# Patient Record
Sex: MALE | Race: WHITE | NOT HISPANIC OR LATINO | Employment: OTHER | URBAN - METROPOLITAN AREA
[De-identification: names, ages, dates, MRNs, and addresses within clinical notes are randomized per-mention and may not be internally consistent; named-entity substitution may affect disease eponyms.]

---

## 2017-02-17 ENCOUNTER — HISTORICAL (OUTPATIENT)
Dept: ADMINISTRATIVE | Facility: HOSPITAL | Age: 67
End: 2017-02-17

## 2017-02-19 LAB
APTT PPP: 41.9 SEC (ref 21.7–37.8)
INR PPP: 1.4
PROTHROMBIN TIME: 17.2 SEC (ref 11.3–15.2)

## 2017-03-03 ENCOUNTER — HISTORICAL (OUTPATIENT)
Dept: ADMINISTRATIVE | Facility: HOSPITAL | Age: 67
End: 2017-03-03

## 2017-03-03 LAB
APTT PPP: 45.1 SEC (ref 21.7–37.8)
BASOPHILS NFR BLD: 0 K/UL (ref 0–0.2)
BASOPHILS NFR BLD: 0.5 %
BUN SERPL-MCNC: 23 MG/DL (ref 8–20)
CALCIUM SERPL-MCNC: 8.3 MG/DL (ref 7.7–10.4)
CHLORIDE: 96 MMOL/L (ref 98–110)
CO2 SERPL-SCNC: 26.6 MMOL/L (ref 22.8–31.6)
CREATININE: 0.96 MG/DL (ref 0.6–1.4)
EOSINOPHIL NFR BLD: 0 K/UL (ref 0–0.7)
EOSINOPHIL NFR BLD: 0.2 %
ERYTHROCYTE [DISTWIDTH] IN BLOOD BY AUTOMATED COUNT: 17.2 % (ref 11.7–14.9)
GLUCOSE: 291 MG/DL (ref 70–99)
GRAN #: 3.8 K/UL (ref 1.4–6.5)
GRAN%: 62.7 %
HCT VFR BLD AUTO: 30.5 % (ref 39–55)
HGB BLD-MCNC: 9.9 G/DL (ref 14–16)
IMMATURE GRANS (ABS): 0.4 K/UL (ref 0–1)
IMMATURE GRANULOCYTES: 7.3 %
INR PPP: 1.3
LYMPH #: 0.4 K/UL (ref 1.2–3.4)
LYMPH%: 7.1 %
MCH RBC QN AUTO: 31.4 PG (ref 25–35)
MCHC RBC AUTO-ENTMCNC: 32.5 G/DL (ref 31–36)
MCV RBC AUTO: 96.8 FL (ref 80–100)
MONO #: 1.3 K/UL (ref 0.1–0.6)
MONO%: 22.2 %
NUCLEATED RBCS: 0 %
PLATELET # BLD AUTO: 105 K/UL (ref 140–440)
PMV BLD AUTO: 12.5 FL (ref 8.8–12.7)
POTASSIUM SERPL-SCNC: 4.4 MMOL/L (ref 3.5–5)
PROTHROMBIN TIME: 16 SEC (ref 11.3–15.2)
RBC # BLD AUTO: 3.15 M/UL (ref 4.3–5.9)
SODIUM: 130 MMOL/L (ref 134–144)
WBC # BLD AUTO: 6 K/UL (ref 5–10)

## 2017-05-05 VITALS — WEIGHT: 315 LBS | HEIGHT: 71 IN | BODY MASS INDEX: 44.1 KG/M2

## 2017-05-05 RX ORDER — HEPARIN 100 UNIT/ML
500 SYRINGE INTRAVENOUS
Status: CANCELLED | OUTPATIENT
Start: 2017-05-12

## 2017-05-05 RX ORDER — SODIUM CHLORIDE 0.9 % (FLUSH) 0.9 %
10 SYRINGE (ML) INJECTION
Status: CANCELLED | OUTPATIENT
Start: 2017-05-12

## 2017-05-18 ENCOUNTER — OFFICE VISIT (OUTPATIENT)
Dept: HEMATOLOGY/ONCOLOGY | Facility: CLINIC | Age: 67
End: 2017-05-18
Payer: MEDICARE

## 2017-05-18 VITALS
BODY MASS INDEX: 48.95 KG/M2 | RESPIRATION RATE: 18 BRPM | TEMPERATURE: 98 F | DIASTOLIC BLOOD PRESSURE: 72 MMHG | WEIGHT: 315 LBS | HEART RATE: 100 BPM | SYSTOLIC BLOOD PRESSURE: 110 MMHG

## 2017-05-18 DIAGNOSIS — D70.1 CHEMOTHERAPY-INDUCED NEUTROPENIA: Primary | ICD-10-CM

## 2017-05-18 DIAGNOSIS — T45.1X5A CHEMOTHERAPY-INDUCED NEUTROPENIA: Primary | ICD-10-CM

## 2017-05-18 DIAGNOSIS — C18.9 COLON CANCER METASTASIZED TO LIVER: ICD-10-CM

## 2017-05-18 DIAGNOSIS — C78.7 COLON CANCER METASTASIZED TO LIVER: ICD-10-CM

## 2017-05-18 PROCEDURE — 99214 OFFICE O/P EST MOD 30 MIN: CPT | Mod: ,,, | Performed by: INTERNAL MEDICINE

## 2017-05-18 RX ORDER — ALBUTEROL SULFATE 90 UG/1
2 AEROSOL, METERED RESPIRATORY (INHALATION) DAILY PRN
COMMUNITY

## 2017-05-18 RX ORDER — HEPARIN 100 UNIT/ML
500 SYRINGE INTRAVENOUS
Status: CANCELLED | OUTPATIENT
Start: 2017-06-23

## 2017-05-18 RX ORDER — SODIUM CHLORIDE 0.9 % (FLUSH) 0.9 %
10 SYRINGE (ML) INJECTION
Status: CANCELLED | OUTPATIENT
Start: 2017-06-23

## 2017-05-18 RX ORDER — FUROSEMIDE 20 MG/1
40 TABLET ORAL 2 TIMES DAILY
COMMUNITY
Start: 2013-12-04

## 2017-05-18 RX ORDER — FLUOROURACIL 50 MG/ML
300 INJECTION, SOLUTION INTRAVENOUS
Status: CANCELLED | OUTPATIENT
Start: 2017-06-14

## 2017-05-18 RX ORDER — ATROPINE SULFATE 0.4 MG/ML
0.4 INJECTION, SOLUTION ENDOTRACHEAL; INTRAMEDULLARY; INTRAMUSCULAR; INTRAVENOUS; SUBCUTANEOUS DAILY PRN
Status: CANCELLED | OUTPATIENT
Start: 2017-06-14

## 2017-05-18 RX ORDER — ACETAMINOPHEN 325 MG/1
650 TABLET ORAL
Status: CANCELLED
Start: 2017-06-14

## 2017-05-18 NOTE — PROGRESS NOTES
"       [facility] HEME/ONC PROGRESS NOTE      Subjective:       Patient ID:   Juan Pablo Chaidez  66 y.o. male.  1950      Chief Complaint:  Colon Cancer and Follow-up (labs)      History of Present Illness:     Patient returns today for a regularly scheduled follow-up visit.  He has Stage 4 colon cancer.  Treated heavily with combination chemotherapy and intrahepatic Y 90.    Recently resumed D1C1 FOLFIRI/ Avastin last week 5/10/17.  C/O much fatigue, decreased taste and appetite.  Spending more time in bed.  HICKEY.  No mucositis or diarrhea. D9C1. Today.    Ch liver dx with ascites.  Requiring paracentesis Friday weekly, to keep him comfortable.    Hx of PE,  He does not want to continue coumadin or other "blood thinner".  He is taking his lasix and aldactone, and inhaler.          ROS:   GEN:  without any fever, night sweats or weight loss.  Admits to increased fatigue.  HEENT: normal with no HA's, sore throat, stiff neck, changes in vision.  Decreased taste.  CV: normal with no CP, SOB, PND, or orthopnea  PULM: normal with no SOB, cough, hemoptysis, sputum or pleuritic pain.  He does have HICKEY.  GI:  See HPI.  : normal with no hematuria, dysuria  BREAST: normal with no mass, discharge, pain  SKIN: normal with no rash, erythema, bruising, or swelling      Allergies:  Review of patient's allergies indicates:   Allergen Reactions    Theophylline     Theophylline sodium glycinate Other (See Comments)     lethargic       Medications:    Current Outpatient Prescriptions:     acetaminophen (TYLENOL) 500 MG tablet, Take 500 mg by mouth as needed for Pain., Disp: , Rfl:     albuterol 90 mcg/actuation inhaler, Inhale 2 puffs into the lungs daily as needed., Disp: , Rfl:     furosemide (LASIX) 20 MG tablet, Take 80 mg by mouth 2 (two) times daily. , Disp: , Rfl:     furosemide (LASIX) 20 MG tablet, Take 40 mg by mouth 2 (two) times daily., Disp: , Rfl:     multivitamin (ONE DAILY MULTIVITAMIN) per tablet, Take 1 tablet " by mouth once daily., Disp: , Rfl:     ondansetron (ZOFRAN-ODT) 4 MG TbDL, Take 8 mg by mouth continuous prn., Disp: , Rfl:     spironolactone (ALDACTONE) 50 MG tablet, TAKE ONE TABLET BY MOUTH TWICE DAILY, Disp: 60 tablet, Rfl: 12      Objective:     Vitals:  Blood pressure 110/72, pulse 100, temperature 97.8 °F (36.6 °C), resp. rate 18, weight (!) 159.2 kg (351 lb).    Physical Examination:   GEN: chronically ill in appearance, increased frailty  HEAD: atraumatic and normocephalic,Pallor +, no jaundice or icterus.EYES: no pallor, no icterus, PERRLA  ENT: OMM, no pharyngeal erythema, external ears WNL; no nasal discharge; no thrush  NECK: no masses, thyroid normal, trachea midline, no LAD/LN's, supple  CV: RRR with no murmur; normal pulse; normal S1 and S2; no pedal edema  CHEST: Normal respiratory effort; CTAB; normal breath sounds; no wheeze or crackles, BS distant but clear.  ABDOM: nontender and large with large fluid wave of ascites soft; normal bowel sounds; no rebound/guarding  MUSC/Skeletal: ROM normal; no crepitus; joints normal; no deformities or arthropathy  EXTREM: no clubbing, cyanosis.  He has chronic stasis changes L & R leg . +2-3 edema.  SKIN: no rashes, lesions, ulcers, petechiae or puerpera subcutaneous nodules  : no cuellar  NEURO: grossly intact; motor/sensory WNL; AAOx3; no tremors  PSYCH: normal mood, affect and behavior  LYMPH: normal cervical, supraclavicular, axillary and groin LN's            Labs:   Lab Results   Component Value Date    WBC 6.0 03/03/2017    HGB 9.9 (L) 03/03/2017    HCT 30.5 (L) 03/03/2017    MCV 96.8 03/03/2017     (L) 03/03/2017    CMP  Sodium   Date Value Ref Range Status   03/03/2017 130 (L) 134 - 144 mmol/L      Potassium   Date Value Ref Range Status   03/03/2017 4.4 3.5 - 5.0 mmol/L      Chloride   Date Value Ref Range Status   03/03/2017 96 (L) 98 - 110 mmol/L      CO2   Date Value Ref Range Status   03/03/2017 26.6 22.8 - 31.6 mmol/L      Glucose    Date Value Ref Range Status   03/03/2017 291 (H) 70 - 99 mg/dL      BUN, Bld   Date Value Ref Range Status   03/03/2017 23 (H) 8 - 20 mg/dL      Creatinine   Date Value Ref Range Status   03/03/2017 0.96 0.60 - 1.40 mg/dL      Calcium   Date Value Ref Range Status   03/03/2017 8.3 7.7 - 10.4 mg/dL      Total Protein   Date Value Ref Range Status   01/26/2015 7.0 6.0 - 8.4 g/dL Final     Albumin   Date Value Ref Range Status   01/26/2015 2.7 (L) 3.5 - 5.2 g/dL Final     Total Bilirubin   Date Value Ref Range Status   01/26/2015 1.0 0.1 - 1.0 mg/dL Final     Comment:     For infants and newborns, interpretation of results should be based  on gestational age, weight and in agreement with clinical  observations.  Premature Infant recommended reference ranges:  Up to 24 hours.............<8.0 mg/dL  Up to 48 hours............<12.0 mg/dL  3-5 days..................<15.0 mg/dL  6-29 days.................<15.0 mg/dL       Alkaline Phosphatase   Date Value Ref Range Status   01/26/2015 186 (H) 55 - 135 U/L Final     AST   Date Value Ref Range Status   01/26/2015 41 (H) 10 - 40 U/L Final     ALT   Date Value Ref Range Status   01/26/2015 27 10 - 44 U/L Final     Anion Gap   Date Value Ref Range Status   01/26/2015 8 8 - 16 mmol/L Final     eGFR if    Date Value Ref Range Status   01/26/2015 >60 >60 mL/min/1.73 m^2 Final     eGFR if non    Date Value Ref Range Status   01/26/2015 >60 >60 mL/min/1.73 m^2 Final     Comment:     Calculation used to obtain the estimated glomerular filtration  rate (eGFR) is the CKD-EPI equation. Since race is unknown   in our information system, the eGFR values for   -American and Non--American patients are given   for each creatinine result.       I have reviewed all available lab results.    Radiology/Diagnostic Studies:    None recently    Assessment/Plan:   (1) 66 y.o. male with diagnosis of  Stage 4 colon cancer.  Increased dx.  Increasing CEA.   Resumed Folfiri/ Avastin last week.  Considerable toxicity.  Decrease dose with course #2 on 5/24/17    (2) Chronic recurrant ascites with chronic liver dx.  Weekly paracentesis at .    (2)Hx of PE and sarcoidosis.  HICKEY. He does not aggree to po anticoagulation.  Continue lasis and aldactone.    [facility] check lab mondays.            Discussion:     I have explained and the patient understands all of  the current recommendation(s). I have answered all of their questions to the best of my ability and to their complete satisfaction.   The patient is to continue with the current management plan.    RTC in 2  weeks        Electronically signed by ADEN Sanz

## 2017-05-18 NOTE — LETTER
May 18, 2017      Cedric Mariscal  [address]  [place] [place] [address]           [facility]  [address] [place] [address] [place] [address]          Patient: Juan Pablo Chaidez   MR Number: [id number]   YOB: 1950   Date of Visit: 5/18/2017       Dear Cedric Mariscal:    Neel, I saw Mr. Chaidez for the reevaluation of his colon cancer condition.. Attached you will find relevant portions of my assessment and plan of care.    If you have questions, please do not hesitate to call me. I look forward to following Juan Pablo Chaidez along with you.    Sincerely,    ADEN Sanz MD    Enclosure  CC:  No Recipients    If you would like to receive this communication electronically, please contact [email or web address] or [phone] to request more information on [facility] [employer] access.    For providers and/or their staff who would like to refer a patient to [facility], please contact us through our one-stop-shop provider referral line, Norm Conti, at [phone].    If you feel you have received this communication in error or would no longer like to receive these types of communications, please e-mail [email or web address]

## 2017-05-22 LAB
BASOPHILS NFR BLD: 0 %
BASOPHILS NFR BLD: 0 K/UL (ref 0–0.2)
EOSINOPHIL NFR BLD: 0 K/UL (ref 0–0.7)
EOSINOPHIL NFR BLD: 4.2 %
ERYTHROCYTE [DISTWIDTH] IN BLOOD BY AUTOMATED COUNT: 15.1 % (ref 11.7–14.9)
GRAN #: 0 K/UL (ref 1.4–6.5)
GRAN%: 16.7 %
HCT VFR BLD AUTO: 22.9 % (ref 39–55)
HGB BLD-MCNC: 7.9 G/DL (ref 14–16)
IMMATURE GRANS (ABS): 0 K/UL (ref 0–1)
IMMATURE GRANULOCYTES: 0 %
IMMATURE PLATELET FRACTION: 11.1 % (ref 0.5–7.5)
LYMPH #: 0.1 K/UL (ref 1.2–3.4)
LYMPH%: 58.3 %
MCH RBC QN AUTO: 33.8 PG (ref 25–35)
MCHC RBC AUTO-ENTMCNC: 34.5 G/DL (ref 31–36)
MCV RBC AUTO: 97.9 FL (ref 80–100)
MONO #: 0.1 K/UL (ref 0.1–0.6)
MONO%: 20.8 %
NUCLEATED RBCS: 0 %
PLATELET # BLD AUTO: 30 K/UL (ref 140–440)
PMV BLD AUTO: 13 FL (ref 8.8–12.7)
RBC # BLD AUTO: 2.34 M/UL (ref 4.3–5.9)
WBC # BLD AUTO: 0.2 K/UL (ref 5–10)

## 2017-05-24 ENCOUNTER — TELEPHONE (OUTPATIENT)
Dept: HEMATOLOGY/ONCOLOGY | Facility: CLINIC | Age: 67
End: 2017-05-24

## 2017-05-24 NOTE — TELEPHONE ENCOUNTER
----- Message from ADEN Sanz MD sent at 5/24/2017  4:48 PM CDT -----  Print this  Out so I can sign it.  ----- Message -----  From: Jenifer Meredith  Sent: 5/23/2017   2:19 PM  To: ADEN Sanz MD    [facility] 05/22/17

## 2017-05-24 NOTE — TELEPHONE ENCOUNTER
Gladys from [facility] called office and requested to speak with you regarding patients blood draw. Lab does not want to draw from port and also states that his peripheral line is a 22 gauge which is too small to draw blood. They would like to know where you recommend they draw blood for patients lab work?   Please advise asap

## 2017-05-25 ENCOUNTER — TELEPHONE (OUTPATIENT)
Dept: HEMATOLOGY/ONCOLOGY | Facility: CLINIC | Age: 67
End: 2017-05-25

## 2017-05-25 NOTE — TELEPHONE ENCOUNTER
Oleksandr with [facility] would like clarification on the Port removal orders.    Is the port to be just removed or is it to be replaced.    Orders in had is for a picc line placement.    Please call with your orders as the surg is awaiting a response.      Pedro are consulting on this case.

## 2017-05-26 ENCOUNTER — HISTORICAL (OUTPATIENT)
Dept: ADMINISTRATIVE | Facility: HOSPITAL | Age: 67
End: 2017-05-26

## 2017-05-30 ENCOUNTER — TELEPHONE (OUTPATIENT)
Dept: HEMATOLOGY/ONCOLOGY | Facility: CLINIC | Age: 67
End: 2017-05-30

## 2017-05-31 ENCOUNTER — TELEPHONE (OUTPATIENT)
Dept: HEMATOLOGY/ONCOLOGY | Facility: CLINIC | Age: 67
End: 2017-05-31

## 2017-06-01 ENCOUNTER — TELEPHONE (OUTPATIENT)
Dept: HEMATOLOGY/ONCOLOGY | Facility: CLINIC | Age: 67
End: 2017-06-01

## 2017-06-01 NOTE — TELEPHONE ENCOUNTER
Spoke with pt. He is in [facility] rehab in [place]. Spoke to Nydia LUJAN at [facility]. She said his labs were stable they draw them Q MON. His H/H was 9.8 and 28.7.this was drawn 5/29.

## 2017-06-01 NOTE — TELEPHONE ENCOUNTER
rtn call to carmelo states she has been in contact with [facility] and would like to speak with her concerning orders for this pt. Referred to [facility] location phone number given.

## 2017-06-01 NOTE — TELEPHONE ENCOUNTER
Beverly, lab shows he needs neupogen x's 3-4 days and 2 pints of blood.  Does he want to do this at  today and tomorrow?

## 2017-06-01 NOTE — TELEPHONE ENCOUNTER
----- Message from Jenifer Meredith sent at 6/1/2017  3:26 PM CDT -----  Contact: carmelo Andres with [facility] rehab called and needs to speak t owendy asa about orders for this pt. [phone]

## 2017-06-05 ENCOUNTER — TELEPHONE (OUTPATIENT)
Dept: HEMATOLOGY/ONCOLOGY | Facility: CLINIC | Age: 67
End: 2017-06-05

## 2017-06-05 LAB
ALBUMIN SERPL-MCNC: 2.4 G/DL (ref 3.1–4.7)
ALP SERPL-CCNC: 286 IU/L (ref 40–104)
ALT (SGPT): 45 IU/L (ref 3–33)
APTT PPP: 30 SEC (ref 21.7–37.8)
AST SERPL-CCNC: 51 IU/L (ref 10–40)
BILIRUB SERPL-MCNC: 1.8 MG/DL (ref 0.3–1)
BUN SERPL-MCNC: 26 MG/DL (ref 8–20)
CALCIUM SERPL-MCNC: 8.5 MG/DL (ref 7.7–10.4)
CHLORIDE: 104 MMOL/L (ref 98–110)
CO2 SERPL-SCNC: 26.6 MMOL/L (ref 22.8–31.6)
CREATININE: 1.68 MG/DL (ref 0.6–1.4)
GLUCOSE: 156 MG/DL (ref 70–99)
HCT VFR BLD AUTO: 32.5 % (ref 39–55)
HGB BLD-MCNC: 10.6 G/DL (ref 14–16)
INR PPP: 1.1
MCH RBC QN AUTO: 34.6 PG (ref 25–35)
MCHC RBC AUTO-ENTMCNC: 32.6 G/DL (ref 31–36)
MCV RBC AUTO: 106.2 FL (ref 80–100)
NUCLEATED RBCS: 0 %
PLATELET # BLD AUTO: 133 K/UL (ref 140–440)
POTASSIUM SERPL-SCNC: 4.5 MMOL/L (ref 3.5–5)
PROT SERPL-MCNC: 6.4 G/DL (ref 6–8.2)
PROTHROMBIN TIME: 14.5 SEC (ref 11.3–15.2)
RBC # BLD AUTO: 3.06 M/UL (ref 4.3–5.9)
SODIUM: 137 MMOL/L (ref 134–144)
WBC # BLD AUTO: 6.5 K/UL (ref 5–10)

## 2017-06-05 NOTE — TELEPHONE ENCOUNTER
----- Message from Jenifer Meredith sent at 6/5/2017  2:06 PM CDT -----  Pt called in seeing if he needs orders for blood transfusion at [facility] please call pt and advise

## 2017-06-05 NOTE — TELEPHONE ENCOUNTER
Spoke to pt based on todays labs he does not need a transfusion. He will recheck his labs in 1 week.

## 2017-06-08 ENCOUNTER — TELEPHONE (OUTPATIENT)
Dept: HEMATOLOGY/ONCOLOGY | Facility: CLINIC | Age: 67
End: 2017-06-08

## 2017-06-13 ENCOUNTER — TELEPHONE (OUTPATIENT)
Dept: HEMATOLOGY/ONCOLOGY | Facility: CLINIC | Age: 67
End: 2017-06-13

## 2017-06-13 NOTE — TELEPHONE ENCOUNTER
Pt called wanting to know when he should return to see you. Was scheduled for chemo on 6/14, I told him to cancel that until we spoke with you to see when he wanted to resume. He is home form rehab and had port removed and has a PICC line in place now. When to have him RTC?

## 2017-06-13 NOTE — TELEPHONE ENCOUNTER
----- Message from Ivanna Velasco sent at 6/13/2017  1:43 PM CDT -----  Mr. Chaidez called and wants you to call him at [phone]. Wants to know when he will be starting up chemo again and had other questions.

## 2017-06-15 ENCOUNTER — OFFICE VISIT (OUTPATIENT)
Dept: HEMATOLOGY/ONCOLOGY | Facility: CLINIC | Age: 67
End: 2017-06-15
Payer: MEDICARE

## 2017-06-15 VITALS
RESPIRATION RATE: 20 BRPM | HEART RATE: 80 BPM | BODY MASS INDEX: 50.21 KG/M2 | WEIGHT: 315 LBS | SYSTOLIC BLOOD PRESSURE: 118 MMHG | TEMPERATURE: 98 F | DIASTOLIC BLOOD PRESSURE: 60 MMHG

## 2017-06-15 DIAGNOSIS — A41.1 SEPSIS DUE TO OTHER SPECIFIED STAPHYLOCOCCUS: ICD-10-CM

## 2017-06-15 PROCEDURE — 1159F MED LIST DOCD IN RCRD: CPT | Mod: ,,, | Performed by: INTERNAL MEDICINE

## 2017-06-15 PROCEDURE — 99214 OFFICE O/P EST MOD 30 MIN: CPT | Mod: ,,, | Performed by: INTERNAL MEDICINE

## 2017-06-15 RX ORDER — ALBUTEROL SULFATE 90 UG/1
2 AEROSOL, METERED RESPIRATORY (INHALATION)
COMMUNITY

## 2017-06-15 RX ORDER — GLIMEPIRIDE 1 MG/1
1 TABLET ORAL
COMMUNITY

## 2017-06-20 VITALS — BODY MASS INDEX: 44.1 KG/M2 | HEIGHT: 71 IN | WEIGHT: 315 LBS

## 2017-06-20 RX ORDER — ATROPINE SULFATE 0.4 MG/ML
0.4 INJECTION, SOLUTION ENDOTRACHEAL; INTRAMEDULLARY; INTRAMUSCULAR; INTRAVENOUS; SUBCUTANEOUS DAILY PRN
Status: CANCELLED | OUTPATIENT
Start: 2017-06-21

## 2017-06-20 RX ORDER — FLUOROURACIL 50 MG/ML
300 INJECTION, SOLUTION INTRAVENOUS
Status: CANCELLED | OUTPATIENT
Start: 2017-06-21

## 2017-06-20 RX ORDER — ACETAMINOPHEN 325 MG/1
650 TABLET ORAL
Status: CANCELLED
Start: 2017-06-21

## 2017-07-05 ENCOUNTER — HISTORICAL (OUTPATIENT)
Dept: ADMINISTRATIVE | Facility: HOSPITAL | Age: 67
End: 2017-07-05

## 2017-07-10 NOTE — PROGRESS NOTES
"       [facility] HEME/ONC PROGRESS NOTE      Subjective:       Patient ID:   Juan Pablo Chaidez  66 y.o. male.  1950      Chief Complaint:  Colon Cancer (S/P D/C from rehab)      History of Present Illness:     Patient returns today for a regularly scheduled follow-up visit.  He has Stage 4 colon cancer.  Treated heavily with combination chemotherapy and intrahepatic Y 90.    Recently resumed D1C1 FOLFIRI/ Avastin last week 5/10/17.  C/O much fatigue, decreased taste and appetite.  Spending more time in bed.  HICKEY.  No mucositis or diarrhea. D9C1. Today.    Ch liver dx with ascites.  Requiring paracentesis Friday weekly, to keep him comfortable.    Hx of PE,  He does not want to continue coumadin or other "blood thinner".  He is taking his lasix and aldactone, and inhaler.    Continue chemotherapy for palliation as tolerated.          ROS:   GEN:  without any fever, night sweats or weight loss.  Admits to increased fatigue.  HEENT: normal with no HA's, sore throat, stiff neck, changes in vision.  Decreased taste.  CV: normal with no CP, SOB, PND, or orthopnea  PULM: normal with no SOB, cough, hemoptysis, sputum or pleuritic pain.  He does have HICKEY.  GI:  See HPI.  : normal with no hematuria, dysuria  BREAST: normal with no mass, discharge, pain  SKIN: normal with no rash, erythema, bruising, or swelling      Allergies:  Review of patient's allergies indicates:   Allergen Reactions    Theophylline     Theophylline sodium glycinate Other (See Comments)     lethargic       Medications:    Current Outpatient Prescriptions:     acetaminophen (TYLENOL) 500 MG tablet, Take 500 mg by mouth as needed for Pain., Disp: , Rfl:     albuterol 90 mcg/actuation inhaler, Inhale 2 puffs into the lungs daily as needed., Disp: , Rfl:     glimepiride (AMARYL) 1 MG tablet, Take 1 mg by mouth before breakfast., Disp: , Rfl:     ondansetron (ZOFRAN-ODT) 4 MG TbDL, Take 8 mg by mouth continuous prn., Disp: , Rfl:     spironolactone " (ALDACTONE) 50 MG tablet, TAKE ONE TABLET BY MOUTH TWICE DAILY, Disp: 60 tablet, Rfl: 12    albuterol 90 mcg/actuation inhaler, Inhale 2 puffs into the lungs., Disp: , Rfl:     furosemide (LASIX) 20 MG tablet, Take 80 mg by mouth 2 (two) times daily. , Disp: , Rfl:     furosemide (LASIX) 20 MG tablet, Take 40 mg by mouth 2 (two) times daily., Disp: , Rfl:     multivitamin (ONE DAILY MULTIVITAMIN) per tablet, Take 1 tablet by mouth once daily., Disp: , Rfl:       Objective:     Vitals:  Blood pressure 118/60, pulse 80, temperature 97.8 °F (36.6 °C), resp. rate 20, weight (!) 163.3 kg (360 lb).    Physical Examination:   GEN: chronically ill in appearance, increased frailty  HEAD: atraumatic and normocephalic,Pallor +, no jaundice or icterus.EYES: no pallor, no icterus, PERRLA  ENT: OMM, no pharyngeal erythema, external ears WNL; no nasal discharge; no thrush  NECK: no masses, thyroid normal, trachea midline, no LAD/LN's, supple  CV: RRR with no murmur; normal pulse; normal S1 and S2; no pedal edema  CHEST: Normal respiratory effort; CTAB; normal breath sounds; no wheeze or crackles, BS distant but clear.  ABDOM: nontender and large with large fluid wave of ascites soft; normal bowel sounds; no rebound/guarding  MUSC/Skeletal: ROM normal; no crepitus; joints normal; no deformities or arthropathy  EXTREM: no clubbing, cyanosis.  He has chronic stasis changes L & R leg . +2-3 edema.  SKIN: no rashes, lesions, ulcers, petechiae or puerpera subcutaneous nodules  : no cuellar  NEURO: grossly intact; motor/sensory WNL; AAOx3; no tremors  PSYCH: normal mood, affect and behavior  LYMPH: normal cervical, supraclavicular, axillary and groin LN's            Labs:   Lab Results   Component Value Date    WBC 6.5 06/05/2017    HGB 10.6 (L) 06/05/2017    HCT 32.5 (L) 06/05/2017    .2 (H) 06/05/2017     (L) 06/05/2017    CMP  Sodium   Date Value Ref Range Status   06/05/2017 137 134 - 144 mmol/L      Potassium    Date Value Ref Range Status   06/05/2017 4.5 3.5 - 5.0 mmol/L      Chloride   Date Value Ref Range Status   06/05/2017 104 98 - 110 mmol/L      CO2   Date Value Ref Range Status   06/05/2017 26.6 22.8 - 31.6 mmol/L      Glucose   Date Value Ref Range Status   06/05/2017 156 (H) 70 - 99 mg/dL      BUN, Bld   Date Value Ref Range Status   06/05/2017 26 (H) 8 - 20 mg/dL      Creatinine   Date Value Ref Range Status   06/05/2017 1.68 (H) 0.60 - 1.40 mg/dL      Calcium   Date Value Ref Range Status   06/05/2017 8.5 7.7 - 10.4 mg/dL      Total Protein   Date Value Ref Range Status   06/05/2017 6.4 6.0 - 8.2 g/dL      Albumin   Date Value Ref Range Status   06/05/2017 2.4 (L) 3.1 - 4.7 g/dL      Total Bilirubin   Date Value Ref Range Status   06/05/2017 1.8 (H) 0.3 - 1.0 mg/dL      Alkaline Phosphatase   Date Value Ref Range Status   06/05/2017 286 (H) 40 - 104 IU/L      AST   Date Value Ref Range Status   06/05/2017 51 (H) 10 - 40 IU/L      ALT   Date Value Ref Range Status   01/26/2015 27 10 - 44 U/L Final     Anion Gap   Date Value Ref Range Status   01/26/2015 8 8 - 16 mmol/L Final     eGFR if    Date Value Ref Range Status   01/26/2015 >60 >60 mL/min/1.73 m^2 Final     eGFR if non    Date Value Ref Range Status   01/26/2015 >60 >60 mL/min/1.73 m^2 Final     Comment:     Calculation used to obtain the estimated glomerular filtration  rate (eGFR) is the CKD-EPI equation. Since race is unknown   in our information system, the eGFR values for   -American and Non--American patients are given   for each creatinine result.       I have reviewed all available lab results.    Radiology/Diagnostic Studies:    None recently    Assessment/Plan:   (1) 66 y.o. male with diagnosis of  Stage 4 colon cancer.  Increased dx.  Increasing CEA.  Resumed Folfiri/ Avastin last week.  Considerable toxicity.  Continue chemotherapy at decreased dosage as pt able to tolerate.    (2) Chronic  recurrant ascites with chronic liver dx.  Weekly paracentesis at .    (2)Hx of PE and sarcoidosis.  HICKEY. He does not aggree to po anticoagulation.  Continue lasix and aldactone.    [facility] check lab mondays.            Discussion:     I have explained and the patient understands all of  the current recommendation(s). I have answered all of their questions to the best of my ability and to their complete satisfaction.   The patient is to continue with the current management plan.    RTC in 2  weeks        Electronically signed by ADEN Sanz